# Patient Record
Sex: FEMALE | HISPANIC OR LATINO
[De-identification: names, ages, dates, MRNs, and addresses within clinical notes are randomized per-mention and may not be internally consistent; named-entity substitution may affect disease eponyms.]

---

## 2022-11-28 PROBLEM — Z00.00 ENCOUNTER FOR PREVENTIVE HEALTH EXAMINATION: Status: ACTIVE | Noted: 2022-11-28

## 2023-01-03 ENCOUNTER — APPOINTMENT (OUTPATIENT)
Dept: NEUROSURGERY | Facility: CLINIC | Age: 53
End: 2023-01-03
Payer: MEDICAID

## 2023-01-03 VITALS
DIASTOLIC BLOOD PRESSURE: 67 MMHG | SYSTOLIC BLOOD PRESSURE: 138 MMHG | HEART RATE: 72 BPM | BODY MASS INDEX: 23.79 KG/M2 | HEIGHT: 68 IN | WEIGHT: 157 LBS | OXYGEN SATURATION: 98 % | TEMPERATURE: 98.2 F | RESPIRATION RATE: 16 BRPM

## 2023-01-03 DIAGNOSIS — D35.2 BENIGN NEOPLASM OF PITUITARY GLAND: ICD-10-CM

## 2023-01-03 DIAGNOSIS — Z86.39 PERSONAL HISTORY OF OTHER ENDOCRINE, NUTRITIONAL AND METABOLIC DISEASE: ICD-10-CM

## 2023-01-03 DIAGNOSIS — Z78.9 OTHER SPECIFIED HEALTH STATUS: ICD-10-CM

## 2023-01-03 DIAGNOSIS — Z86.79 PERSONAL HISTORY OF OTHER DISEASES OF THE CIRCULATORY SYSTEM: ICD-10-CM

## 2023-01-03 DIAGNOSIS — Z87.42 PERSONAL HISTORY OF OTHER DISEASES OF THE FEMALE GENITAL TRACT: ICD-10-CM

## 2023-01-03 DIAGNOSIS — R51.9 HEADACHE, UNSPECIFIED: ICD-10-CM

## 2023-01-03 DIAGNOSIS — D33.2 BENIGN NEOPLASM OF BRAIN, UNSPECIFIED: ICD-10-CM

## 2023-01-03 DIAGNOSIS — Z82.49 FAMILY HISTORY OF ISCHEMIC HEART DISEASE AND OTHER DISEASES OF THE CIRCULATORY SYSTEM: ICD-10-CM

## 2023-01-03 PROCEDURE — 99203 OFFICE O/P NEW LOW 30 MIN: CPT

## 2023-01-03 RX ORDER — DULAGLUTIDE 0.75 MG/.5ML
0.75 INJECTION, SOLUTION SUBCUTANEOUS
Refills: 0 | Status: ACTIVE | COMMUNITY

## 2023-01-03 RX ORDER — METFORMIN HYDROCHLORIDE 1000 MG/1
1000 TABLET, COATED ORAL
Refills: 0 | Status: ACTIVE | COMMUNITY

## 2023-01-03 RX ORDER — ATORVASTATIN CALCIUM 20 MG/1
20 TABLET, FILM COATED ORAL
Refills: 0 | Status: ACTIVE | COMMUNITY

## 2023-01-03 RX ORDER — REPAGLINIDE 2 MG/1
2 TABLET ORAL
Refills: 0 | Status: ACTIVE | COMMUNITY

## 2023-01-03 NOTE — ASSESSMENT
[FreeTextEntry1] : Mrs. Loving is a 51 y/o, female, with a hx of pituitary adenoma measuring 6 x 6 x 5 mm. Pituitary labs reviewed with pt. Will obtain repeat MRI pituitary in March, for 6 month f/u. Pt to f/u with endocrine for control of A1c and low prolactin. Optho evaluation scheduled for Feb, 2023. F/u once repeat MRI completed.

## 2023-01-03 NOTE — RESULTS/DATA
[FreeTextEntry1] : IMPRESSION: 6 x 6 x 5 mm hypoenhancing pituitary nodule which demonstrates abnormal precontrast signal. This may represent a complex pituitary microadenoma which may demonstrate hemorrhage, a complex cyst, or a calcified nodule. If a prior examination is made available, a comparison can be performed.\par

## 2023-01-03 NOTE — HISTORY OF PRESENT ILLNESS
[FreeTextEntry1] : pituitary adenoma  [de-identified] :  is a 53 y/o, female, with a hx of DM, HTN, HLD, HA, and recently dx possible pituitary microadenoma. She reports x 1 year she began to develop worsening HA along with numbness of tongue and hands. She was seen by neurologist and pcp who ordered MRI pituitary signifying 6 x 6 x5 mm pituitary nodule. She had pituitary labs performed which showed low prolactin 4.4 ad elevated A1c of 9.4%.She denies any change in vision, abnormal weight loss/gain, n/v, weakness, galactorrhea, and seizures.  She has been seen by endocrinologist Dr. Easley and is scheduled to f/u in 02/23. She has also been referred to optho, pending evaluation.

## 2023-01-03 NOTE — REVIEW OF SYSTEMS
[As Noted in HPI] : as noted in HPI [Numbness] : numbness [Tension Headache] : tension-type headaches [Negative] : Heme/Lymph [de-identified] : pituitary lesion

## 2024-08-19 ENCOUNTER — APPOINTMENT (OUTPATIENT)
Dept: ENDOCRINOLOGY | Facility: CLINIC | Age: 54
End: 2024-08-19

## 2024-10-18 ENCOUNTER — APPOINTMENT (OUTPATIENT)
Dept: INTERNAL MEDICINE | Facility: CLINIC | Age: 54
End: 2024-10-18